# Patient Record
Sex: FEMALE | Race: WHITE
[De-identification: names, ages, dates, MRNs, and addresses within clinical notes are randomized per-mention and may not be internally consistent; named-entity substitution may affect disease eponyms.]

---

## 2017-05-16 ENCOUNTER — HOSPITAL ENCOUNTER (OUTPATIENT)
Dept: HOSPITAL 47 - RADMAMWWP | Age: 47
Discharge: HOME | End: 2017-05-16
Payer: COMMERCIAL

## 2017-05-16 DIAGNOSIS — N64.52: ICD-10-CM

## 2017-05-16 DIAGNOSIS — Z12.31: Primary | ICD-10-CM

## 2017-05-17 NOTE — MM
Reason for exam: screening  (asymptomatic).

Last mammogram was performed 1 year and 8 months ago.



History:

Took hormonal contraceptives for 11 years beginning at age 17.



Physical Findings:

A clinical breast exam by your physician is recommended on an annual basis and 

results should be correlated with mammographic findings.



MG Screening Mammo w CAD

Bilateral CC and MLO view(s) were taken.

Prior study comparison: September 25, 2015, bilateral MG screening mammo w CAD.

The breast tissue is extremely dense which could obscure a lesion on mammography. 

No significant changes when compared with prior studies.





ASSESSMENT: Benign, BI-RAD 2



RECOMMENDATION:

Routine screening mammogram of both breasts in 1 year.

## 2018-01-12 ENCOUNTER — HOSPITAL ENCOUNTER (OUTPATIENT)
Dept: HOSPITAL 47 - RADMAMWWP | Age: 48
Discharge: HOME | End: 2018-01-12
Payer: COMMERCIAL

## 2018-01-12 DIAGNOSIS — N64.4: ICD-10-CM

## 2018-01-12 DIAGNOSIS — N64.52: Primary | ICD-10-CM

## 2018-01-12 PROCEDURE — 77065 DX MAMMO INCL CAD UNI: CPT

## 2018-01-12 PROCEDURE — 76641 ULTRASOUND BREAST COMPLETE: CPT

## 2018-01-12 NOTE — USB
Reason for exam: clinical finding.



History:

Took hormonal contraceptives for 11 years beginning at age 17.



US Breast LT

Left breast ultrasound includes all four quadrants, the retroareolar region and 

axilla. Finding demonstrates a 0.6 x 0.3 x 0.7cm mixed lesion at 1 o'clock for 

which a biopsy is recommended, a 0.8 x 0.4 x 0.6cm hyperechoic lesion within a 

duct at 5 o'clock for which a biopsy is recommended, a 0.3 x 0.3 x 0.5cm lesion at

6 o'clock, increased through transmission, benign cyst and duct ectasia at the 

posterior nipple.



These results were verbally communicated with the patient and result sheet given 

to the patient on 1/12/18.





ASSESSMENT: Suspicious, BI-RAD 4



RECOMMENDATION:

Ultrasound core biopsy of the left breast.



Called Dr. Cai with mammographic findings and has scheduled an appointment for 

the patient for 1/23/18 at 11:20 with Dr. Storey.

Biopsy scheduled for 1/15/18 at 12:20.



PRELIMINARY REPORT CALLED AND FAXED TO DR. STOREY ON 1/12/18.

## 2018-01-12 NOTE — MM
Reason for exam: clinical finding.

Last mammogram was performed 8 months ago.



History:

Took hormonal contraceptives for 11 years beginning at age 17.



Physical Findings:

Nurse did not find any significant physical abnormalities on exam.



MG 3D Diag Mammo W/Cad LT

CC, MLO, and XCCL view(s) were taken of the left breast.

Prior study comparison: May 16, 2017, bilateral MG screening mammo w CAD.  

September 25, 2015, bilateral MG screening mammo w CAD.

The breast tissue is heterogeneously dense. This may lower the sensitivity of 

mammography.  No suspicious abnormality.



These results were verbally communicated with the patient and result sheet given 

to the patient on 1/12/18.





ASSESSMENT: Negative, BI-RAD 1



RECOMMENDATION:

Return to routine screening mammogram schedule for both breasts.

Back on schedule for May 2018.

## 2018-01-15 ENCOUNTER — HOSPITAL ENCOUNTER (OUTPATIENT)
Dept: HOSPITAL 47 - RADUSWWP | Age: 48
End: 2018-01-15
Attending: SURGERY
Payer: COMMERCIAL

## 2018-01-15 VITALS — RESPIRATION RATE: 16 BRPM

## 2018-01-15 VITALS — BODY MASS INDEX: 32.3 KG/M2

## 2018-01-15 VITALS — DIASTOLIC BLOOD PRESSURE: 80 MMHG | SYSTOLIC BLOOD PRESSURE: 123 MMHG | TEMPERATURE: 99.1 F | HEART RATE: 75 BPM

## 2018-01-15 DIAGNOSIS — D24.2: Primary | ICD-10-CM

## 2018-01-15 DIAGNOSIS — N60.82: ICD-10-CM

## 2018-01-15 DIAGNOSIS — N60.32: ICD-10-CM

## 2018-01-15 DIAGNOSIS — R92.8: ICD-10-CM

## 2018-01-15 DIAGNOSIS — N60.42: ICD-10-CM

## 2018-01-15 DIAGNOSIS — N60.02: ICD-10-CM

## 2018-01-15 DIAGNOSIS — N60.92: ICD-10-CM

## 2018-01-15 PROCEDURE — 88305 TISSUE EXAM BY PATHOLOGIST: CPT

## 2018-01-15 PROCEDURE — 19083 BX BREAST 1ST LESION US IMAG: CPT

## 2018-01-15 PROCEDURE — 19084 BX BREAST ADD LESION US IMAG: CPT

## 2018-01-15 PROCEDURE — 77065 DX MAMMO INCL CAD UNI: CPT

## 2018-01-15 NOTE — USB
EXAMINATION TYPE: 

 

US biopsy breast VAD LT, 

US biopsy breast add'l VAD LT, 

Post biopsy diagnostic mammo LT wo CAD

 

DATE OF EXAM: 1/15/2018

 

CLINICAL HISTORY: 47-year-old female R92.8 Abnormal Mammogram.

 

TECHNIQUE: Ultrasound guided core biopsy of the left breast, 2 sites. Patient 
with bloody nipple discharge.

 

COMPARISON: 1/12/2018

 

FINDINGS: The procedure of ultrasound guided core biopsy was explained to the 
patient.  Benefits, alternatives, and risks were discussed.  An informed 
consent was then obtained.  

 

The patient was placed in supine positioning for  imaging and for the 
procedure. The overlying skin was prepped and draped in usual sterile fashion.  
Lidocaine buffered with bicarbonate was used as anesthetic into the skin and 
subcutaneous tissue up to area of concern in the left breast at each lesion in 
turn. 

 

SITE 1, intraductal mass, 5:00: Under ultrasound guidance, a a 13-gauge vacuum-
assisted mammotome Elite biopsy gun device was used to obtain 6 core samples.  
Following this, a coil clip was left in lesion.  Small amount of bleeding was 
present, approximately 5 ml or less.

 

SITE 2, oval hypoechoic, 1:00: Under ultrasound guidance, a a 13-gauge vacuum-
assisted mammotome Elite biopsy gun device was used to obtain 5 core samples.  
Following this, a ribbon clip was left in lesion.  

 

 

The patient tolerated the procedure well without any immediate complication.  
The patient was kept in the radiology department for short stay after the 
procedure and then discharged home in stable condition.  

 

 

 

IMPRESSION: Successful, uncomplicated ultrasound guided core biopsy of area of 
concern in the left breast, 2 sites:

 

Site 1, intraductal mass likely etiology of patient's bloody nipple discharge (
coil clip).

Site 2, ovoid hypoechoic lesion, relatively low suspicion (ribbon clip).

 

Full pathology results to follow. 

 

Pathology Results: High Risk

 

A. BREAST, LEFT, 5:00, STEREOTACTIC CORE BIOPSY: INTRADUCTAL PAPILLOMA. STROMAL 
FIBROSIS, CYST FORMATION, APOCRINE METAPLASIA, DUCT ECTASIA, AND DUCT 
HYPERPLASIA.



B. BREAST, LEFT, 1:00, BIOPSY: FIBROCYSTIC CHANGE (FIBROSIS, CYST FORMATION, 
APOCRINE METAPLASIA, DUCT ECTASIA AND DUCT HYPERPLASIA). 



Recommendation

Surgical consult of the left breast.
HOLLI

## 2018-01-26 ENCOUNTER — HOSPITAL ENCOUNTER (OUTPATIENT)
Dept: HOSPITAL 47 - OR | Age: 48
Discharge: HOME | End: 2018-01-26
Attending: SURGERY
Payer: COMMERCIAL

## 2018-01-26 VITALS — RESPIRATION RATE: 16 BRPM

## 2018-01-26 VITALS — HEART RATE: 91 BPM | DIASTOLIC BLOOD PRESSURE: 79 MMHG | SYSTOLIC BLOOD PRESSURE: 130 MMHG

## 2018-01-26 VITALS — BODY MASS INDEX: 32.3 KG/M2

## 2018-01-26 VITALS — TEMPERATURE: 97.9 F

## 2018-01-26 DIAGNOSIS — Z79.899: ICD-10-CM

## 2018-01-26 DIAGNOSIS — N60.32: ICD-10-CM

## 2018-01-26 DIAGNOSIS — Z86.010: ICD-10-CM

## 2018-01-26 DIAGNOSIS — D24.2: Primary | ICD-10-CM

## 2018-01-26 DIAGNOSIS — N60.82: ICD-10-CM

## 2018-01-26 DIAGNOSIS — N60.22: ICD-10-CM

## 2018-01-26 DIAGNOSIS — Z87.891: ICD-10-CM

## 2018-01-26 PROCEDURE — 88307 TISSUE EXAM BY PATHOLOGIST: CPT

## 2018-01-26 PROCEDURE — 19281 PERQ DEVICE BREAST 1ST IMAG: CPT

## 2018-01-26 PROCEDURE — 76098 X-RAY EXAM SURGICAL SPECIMEN: CPT

## 2018-01-26 PROCEDURE — 19125 EXCISION BREAST LESION: CPT

## 2018-01-26 NOTE — P.GSHP
History of Present Illness


H&P Date: 01/26/18


Chief Complaint: Abnormal breast imaging study


47 yrs old female presenting with bloody nipple discharge left breast. No 

breast pain . No axillary mass or breast mass. Mammogram was negative. US 

showed suspicious at 1 and 5 o clock and biopsy recommended.


US guided core bx performed and shows following:


A. BREAST, LEFT, 5:00, STEREOTACTIC CORE BIOPSY: INTRADUCTAL PAPILLOMA. STROMAL 

FIBROSIS, CYST FORMATION, APOCRINE METAPLASIA, DUCT ECTASIA, AND DUCT 

HYPERPLASIA.





B. BREAST, LEFT, 1:00, BIOPSY: FIBROCYSTIC CHANGE (FIBROSIS, CYST FORMATION, 

APOCRINE METAPLASIA, DUCT ECTASIA AND DUCT HYPERPLASIA).








- Review of Systems


Comment: 


Constitutional:  No fever, chills or rigors.  No weight loss or loss of 

appetite.  


HEENT:  No difficulty with hearing, vision and swallowing. 


Lymphatic: No axillary, inguinal and cervical swellings. 


Endocrine:  No thyroid disorders.  Denies history of diabetes. 


Respiratory:  No chest pain, shortness of breath,  and cough.  No hemoptysis.   


Cardiovascular:  No palpitations, irregular HR  


Gastrointestinal:  Denies heartburn.  No change in bowel habits. No nausea or 

vomiting. 


Genitourinary: No increase in urinary frequency or urgency.  No hematuria.    


Musculoskeletal:  No back pain, joint stiffness or pain.  


Neurologic:  No history of seizure disorder and headaches. 


Psychiatric:  Denies depression or  anxiety . No suicidal ideation. 


Hematologic: Denies any abnormal mucosal bleeding or easy bruising.








Past Medical History


Past Medical History: No Reported History


Additional Past Medical History / Comment(s): papilloma left breast,hx colon 

polyps


History of Any Multi-Drug Resistant Organisms: None Reported


Past Surgical History: Tonsillectomy


Additional Past Surgical History / Comment(s): left breast bx,colonoscopy


Past Anesthesia/Blood Transfusion Reactions: No Reported Reaction


Smoking Status: Former smoker





- Past Family History


  ** Mother


History Unknown: Yes





Medications and Allergies


 Home Medications











 Medication  Instructions  Recorded  Confirmed  Type


 


PARoxetine [Paxil] 10 mg PO QAM 01/12/18 01/24/18 History


 


buPROPion XL [Wellbutrin Xl] 150 mg PO QAM 01/12/18 01/24/18 History











 Allergies











Allergy/AdvReac Type Severity Reaction Status Date / Time


 


No Known Allergies Allergy   Verified 01/24/18 11:04














Surgical - Exam


General: Patient is alert and oriented to time, place and person and 

cooperative with exam. 


HEENT: No pallor, no icterus, no thyroid enlargement.


Chest: Bilateral equal breath sounds present.  


Cardiovascular: Regular rate and rhythm.


Abdomen: Soft, nontender, nondistended.  


Integumentary: Bilateral lower extremity chronic venous dermatitis.  No active 

ulcers or discharge.


Neurologic: Cranial nerves II-XII intact.  Strength upper and lower extremities 

5/5.  No focal neurologic deficits.  Gait is normal.


Breasts: Symmetrical, no palpable mass. No nipple retraction. No nipple 

discharge








Results





- Imaging


Comments: 





Mammogram and US reviewed





Assessment and Plan


(1) Intraductal papilloma of left breast


Status: Acute   Code(s): D24.2 - BENIGN NEOPLASM OF LEFT BREAST   SNOMED Code(s)

: [id number]


   


Plan: 





1. Intraductal papilloma left breast with bloody nipple discharge 


2. Wire loc excision of left breast lesion at 5 o clock by US.


3. Informed consent obtained

## 2018-01-26 NOTE — MM
EXAMINATION TYPE: MG pre op needle loc LT, MG surgical specimen LT

 

DATE OF EXAM: 1/26/2018

 

COMPARISON: Prior mammogram January 15, 2018 and older studies.

 

CLINICAL HISTORY: High risk ultrasound-guided core biopsy January 15, 2018 with 
papilloma. Positive discharge.

 

TECHNIQUE: Needle localization with wire placement and surgical excision of 
area of concern in the left breast.  

 

FINDINGS: The procedure of needle localization with wire placement and than 
surgical excision was explained to the patient.  Benefits, alternatives, and 
risks were discussed.  An informed consent was then obtained.  

 

The shortest pathway for procedure was chosen.  Shortest pathway was inferior 
approach. The overlying skin was prepped and draped in usual sterile fashion.  
Lidocaine buffered with bicarbonate was used as anesthetic into the skin. 
Lidocaine with epinephrine is used as anesthetic into the deeper tissue up to 
the level of area of concern.  A 5 cm needle was used. During injection of 
anesthetic there was blood and anesthetic coming from nipple. It was placed via 
a inferior approach under mammographic guidance.  Subsequent 90 degrees 
mammogram show the needle to be in satisfactory position relative to the 
targeted area.  At this point, wire was placed and the needle was withdrawn.  
The wire was fixed to patient's skin.  Images were marked for surgeon.  

 

The patient tolerated the procedure well without any immediate complication.  
The patient was kept in the radiology department for short stay after the 
procedure and then taken to surgery for surgical excision.  Targeted biopsy 
clip and wire are identified in specimen mammogram.  The patient was kept in 
hospital for short stay after the procedure and then discharged home in stable 
condition.

 

IMPRESSION: Successful, uncomplicated needle localization with wire placement 
and surgical excision of targeted biopsy clip in the left breast, full 
pathology results to follow.  

 

Pathology Results: High Risk



BREAST, LEFT, NEEDLE LOCALIZATION EXCISION:  INTRADUCTAL PAPILLOMA, MARGINS 
NEGATIVE. BACKGROUND FIBROCYSTIC CHANGES INCLUDING FIBROSIS, CYSTS, APORINE 
METAPLASIA AND SCLEROSING ADENOSIS.   PREVIOUS BIOPSY SITE. 



Recommendation

Follow up mammogram of the left breast in 6 months.
HOLLI

## 2018-02-03 NOTE — P.OP
Date of Procedure: 01/26/18


Preoperative Diagnosis: 


Bloody nipple discharge left breast 


 Intraductal papilloma


Postoperative Diagnosis: 


Same


Procedure(s) Performed: 


Left breast wire localization lumpectomy


Implants: 


NA


Anesthesia: MAC, local


Surgeon: Tiara Storey


Estimated Blood Loss (ml): 5


Pathology: other


Condition: stable


Disposition: PACU


Indications for Procedure: 


47 yrs old female presented with abnormal US left breast. Mammogram was benign. 

US showed lesions at 1, 5 and 6 o clock left breast and biopsy recommended of 1 

and 5 o' clock lesions. 5 o clock biopsy shows intraductal papilloma and 1 o 

clock biopsy shows fibrocystic changes. She has intermittent bloody left nipple 

discharge predominantly from one duct. Patient agreed to undergo wire loc 

lumpectomy of 5 o clock area.


Description of Procedure: 


The mammogram films from wire localization biopsy were reviewed.  The patient 

was brought to the operating room and placed in supine position with both arms 

out.  IV sedation was given as per anesthesia team.  The excess wire was cut 

and the left  breast was prepped using ChloraPrep.  Sterile drapes were 

applied.  A timeout was performed to verify correct patient, correct procedure 

and correct side.  Patient was confirmed to receive perioperative IV antibiotics

, heparin 5000 units subcutaneous injection for DVT prophylaxis and bilateral 

SCDs.


A 3 cm curvilinear skin incision was made along the left areola border at the 

wire exit site.  Superior and inferior subcutaneous flaps were raised in the 

direction of wire.  A 2 cm circumferential breast tissue was removed around the 

wire and the tip of the wire was included in the specimen.  The offending duct 

filled with bloody material was identified  and completely excised.  The 

specimen was then labeled with different colors as per the protocol.  It was 

sent off as a specimen for pathology evaluation . The resulting defect was 

irrigated with normal saline and checked for hemostasis.  The defect measured 

3.8 x 2.2x 1.5 cm.  Medium clips applied to son the cavity. This was closed in 

2 layers using interrupted sutures of 3-0 Vicryl followed by running 

subcuticular stitches of 4-0 Monocryl.  Dermabond skin glue was applied.  The 

sponge, instrument and needle count were correctx 2.


Phone formation received during surgery that the area of concern along with the 

clip and wire was included in the specimen.


Patient tolerated the procedure well and was taken to post anesthesia care unit 

in stable condition.


Final Pathologic Diagnosis


BREAST, LEFT, NEEDLE LOCALIZATION EXCISION:  INTRADUCTAL PAPILLOMA, MARGINS 

NEGATIVE. BACKGROUND FIBROCYSTIC CHANGES INCLUDING FIBROSIS, CYSTS, APOCRINE 

METAPLASIA AND SCLEROSING ADENOSIS.   PREVIOUS BIOPSY SITE.

## 2018-08-10 ENCOUNTER — HOSPITAL ENCOUNTER (OUTPATIENT)
Dept: HOSPITAL 47 - RADMAMWWP | Age: 48
Discharge: HOME | End: 2018-08-10
Attending: OBSTETRICS & GYNECOLOGY
Payer: COMMERCIAL

## 2018-08-10 DIAGNOSIS — R92.8: Primary | ICD-10-CM

## 2018-08-10 PROCEDURE — 77062 BREAST TOMOSYNTHESIS BI: CPT

## 2018-08-10 PROCEDURE — 77066 DX MAMMO INCL CAD BI: CPT

## 2018-08-11 NOTE — MM
Reason for exam: follow-up at short interval from prior study.

Last mammogram was performed 7 months ago.



History:

Patient has history of high-risk lesion on a previous biopsy at age 47.

High risk MG pre op needle loc LT of the left breast, January 26, 2018.  High risk

US biopsy breast VAD LT of the left breast, January 15, 2018.  High risk US 

biopsy breast add'l VAD LT of the left breast, January 15, 2018.

Took hormonal contraceptives for 11 years beginning at age 17.



Physical Findings:

Nurse did not find any significant physical abnormalities on exam.



MG 3D Diag Mammo W/Cad [facility]

Bilateral CC and MLO view(s) were taken.

Prior study comparison: January 15, 2018, left breast MG diagnostic mammo LT wo 

CAD.  January 12, 2018, left breast MG 3d diag mammo w/cad LT.

The breast tissue is heterogeneously dense. This may lower the sensitivity of 

mammography.

Finding: There is an oval mass in the lower inner quadrant of the right breast at 

medial depth for which ultrasound will be performed.

Left breast biopsy marker noted with post excision changes at anterior depth.



These results were verbally communicated with the patient and result sheet given 

to the patient on 8/18/18.





ASSESSMENT: Incomplete: need additional imaging evaluation, BI-RAD 0



RECOMMENDATION:

Ultrasound of the right breast.

Diagnostic ultrasound right breast lower inner quadrant.

## 2018-08-11 NOTE — USB
Reason for exam: additional evaluation requested from prior study.



History:

Patient has history of high-risk lesion on a previous biopsy at age 47.

High risk MG pre op needle loc LT of the left breast, January 26, 2018.  High risk

US biopsy breast VAD LT of the left breast, January 15, 2018.  High risk US 

biopsy breast add'l VAD LT of the left breast, January 15, 2018.

Took hormonal contraceptives for 11 years beginning at age 17.



US Breast Limited RT

Technologist: Abi Cao, RT (R)(M)

Right limited breast ultrasound including focal area of concern, retroareolar and 

axilla demonstrates a 11 x 6 x 6 mm oval cystic lesion at 5 o'clock, a 4 x 4 x 4 

mm cystic lesion at 5 o'clock, correlates with the mammogram.





ASSESSMENT: Benign, BI-RAD 2



RECOMMENDATION:

Routine screening mammogram of both breasts in 1 year.

## 2018-12-04 ENCOUNTER — HOSPITAL ENCOUNTER (OUTPATIENT)
Dept: HOSPITAL 47 - LABPAT | Age: 48
Discharge: HOME | End: 2018-12-04
Attending: OBSTETRICS & GYNECOLOGY
Payer: COMMERCIAL

## 2018-12-04 DIAGNOSIS — Z01.812: Primary | ICD-10-CM

## 2018-12-04 DIAGNOSIS — R87.612: ICD-10-CM

## 2018-12-04 LAB
ANION GAP SERPL CALC-SCNC: 9 MMOL/L
BASOPHILS # BLD AUTO: 0 K/UL (ref 0–0.2)
BASOPHILS NFR BLD AUTO: 0 %
BUN SERPL-SCNC: 12 MG/DL (ref 7–17)
CHLORIDE SERPL-SCNC: 108 MMOL/L (ref 98–107)
CO2 SERPL-SCNC: 25 MMOL/L (ref 22–30)
EOSINOPHIL # BLD AUTO: 0.1 K/UL (ref 0–0.7)
EOSINOPHIL NFR BLD AUTO: 2 %
ERYTHROCYTE [DISTWIDTH] IN BLOOD BY AUTOMATED COUNT: 4.71 M/UL (ref 3.8–5.4)
ERYTHROCYTE [DISTWIDTH] IN BLOOD: 13.3 % (ref 11.5–15.5)
GLUCOSE SERPL-MCNC: 120 MG/DL (ref 74–99)
HCT VFR BLD AUTO: 43.1 % (ref 34–46)
HGB BLD-MCNC: 15 GM/DL (ref 11.4–16)
LYMPHOCYTES # SPEC AUTO: 2.8 K/UL (ref 1–4.8)
LYMPHOCYTES NFR SPEC AUTO: 35 %
MCH RBC QN AUTO: 31.8 PG (ref 25–35)
MCHC RBC AUTO-ENTMCNC: 34.8 G/DL (ref 31–37)
MCV RBC AUTO: 91.3 FL (ref 80–100)
MONOCYTES # BLD AUTO: 0.5 K/UL (ref 0–1)
MONOCYTES NFR BLD AUTO: 6 %
NEUTROPHILS # BLD AUTO: 4.4 K/UL (ref 1.3–7.7)
NEUTROPHILS NFR BLD AUTO: 55 %
PLATELET # BLD AUTO: 239 K/UL (ref 150–450)
POTASSIUM SERPL-SCNC: 4.6 MMOL/L (ref 3.5–5.1)
SODIUM SERPL-SCNC: 142 MMOL/L (ref 137–145)
WBC # BLD AUTO: 8 K/UL (ref 3.8–10.6)

## 2018-12-04 PROCEDURE — 85025 COMPLETE CBC W/AUTO DIFF WBC: CPT

## 2018-12-04 PROCEDURE — 84520 ASSAY OF UREA NITROGEN: CPT

## 2018-12-04 PROCEDURE — 87086 URINE CULTURE/COLONY COUNT: CPT

## 2018-12-04 PROCEDURE — 82565 ASSAY OF CREATININE: CPT

## 2018-12-04 PROCEDURE — 80051 ELECTROLYTE PANEL: CPT

## 2018-12-04 PROCEDURE — 82947 ASSAY GLUCOSE BLOOD QUANT: CPT

## 2018-12-10 ENCOUNTER — HOSPITAL ENCOUNTER (OUTPATIENT)
Dept: HOSPITAL 47 - OR | Age: 48
LOS: 1 days | Discharge: HOME | End: 2018-12-11
Attending: OBSTETRICS & GYNECOLOGY
Payer: COMMERCIAL

## 2018-12-10 VITALS — BODY MASS INDEX: 31.5 KG/M2

## 2018-12-10 DIAGNOSIS — N87.1: Primary | ICD-10-CM

## 2018-12-10 DIAGNOSIS — F41.9: ICD-10-CM

## 2018-12-10 DIAGNOSIS — Z79.899: ICD-10-CM

## 2018-12-10 DIAGNOSIS — Z79.82: ICD-10-CM

## 2018-12-10 DIAGNOSIS — F39: ICD-10-CM

## 2018-12-10 DIAGNOSIS — Z87.891: ICD-10-CM

## 2018-12-10 DIAGNOSIS — Z88.8: ICD-10-CM

## 2018-12-10 DIAGNOSIS — E78.5: ICD-10-CM

## 2018-12-10 DIAGNOSIS — Z79.3: ICD-10-CM

## 2018-12-10 PROCEDURE — 86850 RBC ANTIBODY SCREEN: CPT

## 2018-12-10 PROCEDURE — 88309 TISSUE EXAM BY PATHOLOGIST: CPT

## 2018-12-10 PROCEDURE — 81025 URINE PREGNANCY TEST: CPT

## 2018-12-10 PROCEDURE — 86900 BLOOD TYPING SEROLOGIC ABO: CPT

## 2018-12-10 PROCEDURE — 58260 VAGINAL HYSTERECTOMY: CPT

## 2018-12-10 PROCEDURE — 86901 BLOOD TYPING SEROLOGIC RH(D): CPT

## 2018-12-10 RX ADMIN — POTASSIUM CHLORIDE SCH MLS: 14.9 INJECTION, SOLUTION INTRAVENOUS at 06:27

## 2018-12-10 NOTE — P.OP
Date of Procedure: 12/10/18


Preoperative Diagnosis: 


Persistently abnormal Pap smear


Postoperative Diagnosis: 


Same, normal-appearing ovaries bilaterally, pathology pending


Procedure(s) Performed: 


Vaginal hysterectomy


Anesthesia: spinal


Surgeon: Tracy Cai


Assistant #1: Alok Olivares


Estimated Blood Loss (ml): 25


IV fluids (ml): 800


Urine output (ml): 400


Pathology: other (Cervix and uterus)


Condition: stable


Disposition: PACU


Indications for Procedure: 


Persistently abnormal Pap smear despite LEEP procedure


Operative Findings: 


Normal-appearing ovaries bilaterally


Description of Procedure: 


Patient is brought to the [facility] and suite where a spinal with Duramorph is 

administered.  Antibiotics are given.  Urine hCG is negative.  The appropriate 

timeout is performed to assure proper patient and procedural identification.  

The cervix, vagina, and perineal bodies are all prepped and draped in usual 

sterile fashion.  The weighted speculum was placed into the vagina.  The 

bladder is drained for approximately 400 mL of clear yellow urine.  The 

anterior lip of the cervix is grasped with a double-tooth tenaculum and the 

cervix is injected circumferentially with a dilute Pitressin solution, 10 mL 

total.





A Rampart scalpel blade is used circumferentially to incise the mucosa with a V 

positioning at 6:00.


A sponge is used to sweep the mucosa from the underlying fascial plane.  

Peritoneum is entered at 6:00 and suture tied with 2-0 Vicryl.  The large 

billed speculum was then placed into the vagina.  At all times the mucosa is 

Well from the operative field to avoid bladder and/or ureteral injury.  Bryan 

clamps are used and the right uterosacral ligament is identified, clamped, cut 

and held with a 0 Vicryl suture laterally.  Same is carried out on the 

contralateral pedicle.  Uterine vasculature is identified, clamped cut and 

suture ligated.  2 additional pedicles are taken superior to the vessels.  

Anterior peritoneum was entered at 12:00.  The uterus is "walked out" 

posteriorly.  Bryan clamps are used across the final pedicles and the uterus 

and cervix are removed and sent to pathology.





The pedicles are suture tied with 0 Vicryl, flashed, and retied for excellent 

hemostasis.  Bilateral ovaries are inspected and noted to be normal.  All 

pedicles are reevaluated and noted to be clean and dry.  Speculum is replaced 

with the shallow billed speculum and the 2-0 Vicryl suture is brought around in 

a pursestring fashion to close the peritoneum.  The uterosacral ligament 

complex these are then brought across to incorporate the opposite ligament and 

vaginal mucosa.  3 additional figure-of-eight sutures are used of 0 Vicryl for 

final cuff closure.  Vagina is packed with one-inch iodophor gauze with basic 

tracing.  Simmons catheter is placed and urine is clear.  All sponge needle and 

enhancement counts are correct.  Total estimated blood loss 25 mL's.  Fluid 

replacement 800 mL's.  Patient is brought back to the recovery room in very 

stable condition with a blood pressure of 150/75, pulse of 80, 100% O2 

saturation.

## 2018-12-11 VITALS — TEMPERATURE: 98.7 F | HEART RATE: 69 BPM | SYSTOLIC BLOOD PRESSURE: 128 MMHG | DIASTOLIC BLOOD PRESSURE: 71 MMHG

## 2018-12-11 VITALS — RESPIRATION RATE: 17 BRPM

## 2018-12-11 RX ADMIN — POTASSIUM CHLORIDE SCH: 14.9 INJECTION, SOLUTION INTRAVENOUS at 06:06

## 2018-12-11 RX ADMIN — IBUPROFEN PRN MG: 600 TABLET ORAL at 05:46

## 2018-12-11 RX ADMIN — IBUPROFEN PRN MG: 600 TABLET ORAL at 12:21

## 2018-12-11 NOTE — P.DS
Providers


Date of admission: 





12/10/18


Expected date of discharge: 18


Attending physician: 


Tracy Cai





Primary care physician: 


Jessica Thakkar





Hospital Course: 





This is a 48-year-old white female  2 para  who presented with a 

persistently abnormal Pap smear.  She has had a previous LEEP procedure, 

however repeat Pap smears are still consistent with low-grade JULISA, positive 

ECC.  After thorough consultation, patient elected to proceed with vaginal 

hysterectomy.  Please see dictated history and physical for details.





Patient was admitted and underwent vaginal hysterectomy under my care 

yesterday.  She did very well intraoperatively.  Ovaries appeared normal and 

were left in situ per her wishes.  Vaginal packing was placed, Simmons catheter 

placed, patient did receive spinal with Duramorph.  Please see dictated 

operative note for details.





This morning the patient is doing well.  Simmons catheter and vaginal packing had 

been removed.  She is passing gas.  She is urinating spontaneously.  There is 

no vaginal bleeding.  Vital signs are stable and she is afebrile.  There is no 

CVA tenderness.  Abdomen is soft and nontender, active bowel sounds.  Chest is 

clear.  Patient is judged to be in excellent condition for discharge home.





Patient will follow-up with me in the office in 2 weeks.  I have reminded her 

no intercourse, tampons, heavy lifting, no douching.  No driving for 2 weeks.  

She will call with any vaginal bleeding, any pain not alleviated by over-the-

counter Advil or Aleve, any difficulties ambulating, or indeed with any 

concerns.


Patient Condition at Discharge: Good





Plan - Discharge Summary


Discharge Rx Participant: No


New Discharge Prescriptions: 


No Action


   buPROPion XL [Wellbutrin Xl] 150 mg PO QAM


   PARoxetine [Paxil] 20 mg PO QAM


   Crestor Dose Unk 1 each PO DAILY


Discharge Medication List





PARoxetine [Paxil] 20 mg PO QAM 18 [History]


buPROPion XL [Wellbutrin Xl] 150 mg PO QAM 18 [History]


Crestor Dose Unk 1 each PO DAILY 18 [History]








Follow up Appointment(s)/Referral(s): 


Tracy Cai MD [STAFF PHYSICIAN] - 2 Weeks


Discharge Disposition: HOME SELF-CARE

## 2018-12-11 NOTE — P.PN
Progress Note - Text


Progress Note Date: 12/11/18





Patient is postop day 1 vaginal hysterectomy with Duramorph spinal.  Patient 

doing well with no motor sensory deficits.  No nausea, vomiting, pruritus.  

Ambulate without any complications, tolerating diet.  Patient to be sent home 

today.

## 2019-10-25 ENCOUNTER — HOSPITAL ENCOUNTER (OUTPATIENT)
Dept: HOSPITAL 47 - RADMAMWWP | Age: 49
Discharge: HOME | End: 2019-10-25
Attending: OBSTETRICS & GYNECOLOGY
Payer: COMMERCIAL

## 2019-10-25 DIAGNOSIS — Z12.31: Primary | ICD-10-CM

## 2019-10-25 PROCEDURE — 77067 SCR MAMMO BI INCL CAD: CPT

## 2019-10-25 PROCEDURE — 77063 BREAST TOMOSYNTHESIS BI: CPT

## 2019-10-28 NOTE — MM
Reason for exam: screening  (asymptomatic).

Last mammogram was performed 1 year and 2 months ago.



History:

Patient has history of high-risk lesion on a previous biopsy at age 47.

High risk MG pre op needle loc LT of the left breast, January 26, 2018.  High risk

US biopsy breast VAD LT of the left breast, January 15, 2018.  High risk US 

biopsy breast add'l VAD LT of the left breast, January 15, 2018.

Took hormonal contraceptives for 11 years beginning at age 17.



Physical Findings:

A clinical breast exam by your physician is recommended on an annual basis and 

results should be correlated with mammographic findings.



MG 3D Screening Mammo W/Cad

Bilateral CC and MLO view(s) were taken.

Prior study comparison: August 10, 2018, bilateral MG 3d diag mammo w/cad [facility].  

January 15, 2018, left breast MG diagnostic mammo LT wo CAD.

The breast tissue is extremely dense which could obscure a lesion on mammography. 

Benign appearing bilateral calcifications. Previous mammotome biopsy in the left 

breast.  No significant changes when compared with prior studies.





ASSESSMENT: Benign, BI-RAD 2



RECOMMENDATION:

Routine screening mammogram of both breasts in 1 year.

## 2021-02-18 ENCOUNTER — HOSPITAL ENCOUNTER (OUTPATIENT)
Dept: HOSPITAL 47 - RADMAMWWP | Age: 51
Discharge: HOME | End: 2021-02-18
Attending: OBSTETRICS & GYNECOLOGY
Payer: COMMERCIAL

## 2021-02-18 DIAGNOSIS — Z12.31: Primary | ICD-10-CM

## 2021-02-18 PROCEDURE — 77063 BREAST TOMOSYNTHESIS BI: CPT

## 2021-02-18 PROCEDURE — 77067 SCR MAMMO BI INCL CAD: CPT

## 2021-02-22 NOTE — MM
Reason for exam: screening  (asymptomatic).

Last mammogram was performed 1 year and 4 months ago.



History:

Patient is postmenopausal and has history of high-risk lesion on a previous biopsy

at age 47.

High risk MG pre op needle loc LT of the left breast, January 26, 2018.  High risk

US biopsy breast VAD LT of the left breast, January 15, 2018.  High risk US 

biopsy breast add'l VAD LT of the left breast, January 15, 2018.

Took hormonal contraceptives for 11 years beginning at age 17.



Physical Findings:

A clinical breast exam by your physician is recommended on an annual basis and 

results should be correlated with mammographic findings.



MG 3D Screening Mammo W/Cad

Bilateral CC and MLO view(s) were taken.

Prior study comparison: October 25, 2019, bilateral MG 3d screening mammo w/cad.  

August 10, 2018, bilateral MG 3d diag mammo w/cad [facility].

The breast tissue is heterogeneously dense. This may lower the sensitivity of 

mammography.  Previous mammotome biopsy in the left breast. Focal asymmetry left 

MLO.

This finding is changed when compared with previous exams.





ASSESSMENT: Incomplete: need additional imaging evaluation, BI-RAD 0



RECOMMENDATION:

Special view mammogram of the left breast.



If lesion persists on supplemental views, image directed ultrasound is 

recommended.



[facility] will attempt to contact patient to return for supplemental 

views and ultrasound if indicated.

## 2021-03-03 ENCOUNTER — HOSPITAL ENCOUNTER (OUTPATIENT)
Dept: HOSPITAL 47 - RADMAMWWP | Age: 51
End: 2021-03-03
Attending: OBSTETRICS & GYNECOLOGY
Payer: COMMERCIAL

## 2021-03-03 DIAGNOSIS — Z78.0: ICD-10-CM

## 2021-03-03 DIAGNOSIS — R92.2: Primary | ICD-10-CM

## 2021-03-03 PROCEDURE — 77061 BREAST TOMOSYNTHESIS UNI: CPT

## 2021-03-03 PROCEDURE — 77065 DX MAMMO INCL CAD UNI: CPT

## 2021-03-04 NOTE — MM
Reason for exam: additional evaluation requested from abnormal screening.

Last mammogram was performed less than 1 month ago.



History:

Patient is postmenopausal and has history of high-risk lesion on a previous biopsy

at age 47.

High risk MG pre op needle loc LT of the left breast, January 26, 2018.  High risk

US biopsy breast VAD LT of the left breast, January 15, 2018.  High risk US 

biopsy breast add'l VAD LT of the left breast, January 15, 2018.

Took hormonal contraceptives for 11 years beginning at age 17.



Physical Findings:

Nurse did not find any significant physical abnormalities on exam.



MG 3D Work Up W/Cad LT

Spot compression CC, spot compression MLO, and ML view(s) were taken of the left 

breast.

Prior study comparison: February 18, 2021, bilateral MG 3d screening mammo w/cad. 

October 25, 2019, bilateral MG 3d screening mammo w/cad.

The breast tissue is heterogeneously dense. This may lower the sensitivity of 

mammography.  Previous mammotome biopsy in the left breast. The central posterior 

focal asymmetry appears to disperse on additional views. Precautionary 6 month 

follow up recommended given appearance on the screening exam.



These results were verbally communicated with the patient and result sheet given 

to the patient on 3/3/21.





ASSESSMENT: Probably benign, BI-RAD 3



RECOMMENDATION:

Follow-up diagnostic mammogram of the left breast in 6 months.

## 2021-09-08 ENCOUNTER — HOSPITAL ENCOUNTER (OUTPATIENT)
Dept: HOSPITAL 47 - RADMAMWWP | Age: 51
Discharge: HOME | End: 2021-09-08
Attending: OBSTETRICS & GYNECOLOGY
Payer: COMMERCIAL

## 2021-09-08 DIAGNOSIS — Z78.0: ICD-10-CM

## 2021-09-08 DIAGNOSIS — R92.8: Primary | ICD-10-CM

## 2021-09-08 PROCEDURE — 77061 BREAST TOMOSYNTHESIS UNI: CPT

## 2021-09-08 PROCEDURE — 77065 DX MAMMO INCL CAD UNI: CPT

## 2021-09-08 NOTE — MM
Reason for exam: follow-up at short interval from prior study.

Last mammogram was performed 6 months ago.



History:

Patient is postmenopausal and has history of high-risk lesion on a previous biopsy

at age 47.

High risk MG pre op needle loc LT of the left breast, January 26, 2018.  High risk

US biopsy breast VAD LT of the left breast, January 15, 2018.  High risk US 

biopsy breast add'l VAD LT of the left breast, January 15, 2018.

Took hormonal contraceptives for 11 years beginning at age 17.



Physical Findings:

Nurse did not find any significant physical abnormalities on exam.



MG 3D Diag Mammo W/Cad LT

CC and MLO view(s) were taken of the left breast.

Prior study comparison: March 3, 2021, left breast MG 3d work up w/cad LT.  

February 18, 2021, bilateral MG 3d screening mammo w/cad.

The breast tissue is heterogeneously dense. This may lower the sensitivity of 

mammography.  Previous mammotome biopsy in the left breast. There is no discrete 

abnormality including area of concern.



These results were verbally communicated with the patient and result sheet given 

to the patient on 9/8/21.





ASSESSMENT: Negative, BI-RAD 1



RECOMMENDATION:

Return to routine screening mammogram schedule for both breasts.

Back on schedule for February 2022.

## 2022-07-15 ENCOUNTER — HOSPITAL ENCOUNTER (OUTPATIENT)
Dept: HOSPITAL 47 - RADMAMWWP | Age: 52
Discharge: HOME | End: 2022-07-15
Attending: OBSTETRICS & GYNECOLOGY
Payer: COMMERCIAL

## 2022-07-15 DIAGNOSIS — Z78.0: ICD-10-CM

## 2022-07-15 DIAGNOSIS — Z12.31: Primary | ICD-10-CM

## 2022-07-15 PROCEDURE — 77067 SCR MAMMO BI INCL CAD: CPT

## 2022-07-15 PROCEDURE — 77063 BREAST TOMOSYNTHESIS BI: CPT

## 2022-07-18 NOTE — MM
Reason for Exam: Screening  (asymptomatic). 

Last mammogram was performed 1 year(s) and 5 month(s) ago. 





Patient History: 

Menarche at age 13. First Full-Term Pregnancy at age 29. Hysterectomy at age 49. Postmenopausal.

Hormonal Contraceptives for 11 years from age 17 until age 28. 1/26/2018, High risk Core Biopsy on

the left side. 1/15/2018, High risk Core Biopsy on the left side. 1/15/2018, High risk Core Biopsy

on the left side. 





Risk Values: 

Kathy 5 year model risk: 1.6%.

NCI Lifetime model risk: 14.5%.





Prior Study Comparison: 

2/18/2021 Bilateral Screening Mammogram, [facility]. 3/3/2021 Left Diagnostic Mammogram, [facility]. 9/8/2021 Left

Diagnostic Mammogram, [facility]. 





Tissue Density: 

The breast tissue is heterogeneously dense. This may lower the sensitivity of mammography.





Findings: 

Analyzed By CAD. 

Biopsy clip is seen within the left breast.

There is no suspicious group of microcalcifications or new suspicious mass in either breast. 





Overall Assessment: Negative, BI-RAD 1





Management: 

Screening Mammogram of both breasts in 1 year.

A clinical breast exam by your physician is recommended on an annual basis and results should be

correlated with mammographic findings.



Electronically signed and approved by: Sandeep Echevarria DO

## 2025-04-22 ENCOUNTER — HOSPITAL ENCOUNTER (OUTPATIENT)
Dept: HOSPITAL 47 - RADMAMWWP | Age: 55
Discharge: HOME | End: 2025-04-22
Attending: OBSTETRICS & GYNECOLOGY
Payer: COMMERCIAL

## 2025-04-22 DIAGNOSIS — Z78.0: ICD-10-CM

## 2025-04-22 DIAGNOSIS — Z12.31: Primary | ICD-10-CM

## 2025-04-22 DIAGNOSIS — Z92.0: ICD-10-CM

## 2025-04-22 DIAGNOSIS — R92.333: ICD-10-CM

## 2025-04-22 PROCEDURE — 77063 BREAST TOMOSYNTHESIS BI: CPT

## 2025-04-22 PROCEDURE — 77067 SCR MAMMO BI INCL CAD: CPT
